# Patient Record
Sex: MALE | Race: OTHER | HISPANIC OR LATINO | Employment: UNEMPLOYED | URBAN - METROPOLITAN AREA
[De-identification: names, ages, dates, MRNs, and addresses within clinical notes are randomized per-mention and may not be internally consistent; named-entity substitution may affect disease eponyms.]

---
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
## 2024-08-04 ENCOUNTER — HOSPITAL ENCOUNTER (EMERGENCY)
Facility: HOSPITAL | Age: 19
Discharge: HOME | End: 2024-08-04
Attending: EMERGENCY MEDICINE
Payer: COMMERCIAL

## 2024-08-04 VITALS
RESPIRATION RATE: 18 BRPM | DIASTOLIC BLOOD PRESSURE: 96 MMHG | TEMPERATURE: 98.5 F | HEART RATE: 91 BPM | OXYGEN SATURATION: 98 % | SYSTOLIC BLOOD PRESSURE: 145 MMHG | WEIGHT: 248.24 LBS

## 2024-08-04 DIAGNOSIS — M25.562 ACUTE PAIN OF LEFT KNEE: Primary | ICD-10-CM

## 2024-08-04 DIAGNOSIS — M79.671 HEEL PAIN, BILATERAL: ICD-10-CM

## 2024-08-04 DIAGNOSIS — M79.672 HEEL PAIN, BILATERAL: ICD-10-CM

## 2024-08-04 PROCEDURE — 99282 EMERGENCY DEPT VISIT SF MDM: CPT

## 2024-08-04 PROCEDURE — 99283 EMERGENCY DEPT VISIT LOW MDM: CPT | Performed by: EMERGENCY MEDICINE

## 2024-08-04 NOTE — Clinical Note
Addi Ji was seen and treated in our emergency department on 8/4/2024.                Diagnosis:     Silvinoer  .    He may return on this date:     Addi Ji was seen in the emergency department on August 4 and can return to work on August 6.     If you have any questions or concerns, please don't hesitate to call.      Eli Menon, DO    ______________________________           _______________          _______________  Hospital Representative                              Date                                Time

## 2024-08-04 NOTE — DISCHARGE INSTRUCTIONS
You were seen in the emergency department in the emergency department for your heel and knee pain.    Please return to the emergency department if you develop fever, chills, redness, swelling in your legs, or your symptoms worsen.    Please see your primary care provider to discuss today's visit.    A referral to podiatry has been placed.

## 2024-08-04 NOTE — ED PROVIDER NOTES
History  Chief Complaint   Patient presents with    Leg Pain     In bilateral heels and leg joints for 3 days.  Pt denies trauma     18-year-old previously healthy male presenting for bilateral heel pain that began a month ago.  He states there is no trauma to the area or inciting incident.  He describes it as a dull ache particularly after standing for a long day at work.  Additionally, he has bilateral knee pain which is worse on the left knee that began a few weeks ago.  Also worse with prolonged standing or walking.  He denies any trauma to the area.  He does not endorse any swelling or redness to the area.  He denies fever, redness, swelling, numbness and tingling, travel in the last few months, recent illnesses or sore throats, insect bites.      Leg Pain  Associated symptoms: no back pain, no fatigue and no fever        Prior to Admission Medications   Prescriptions Last Dose Informant Patient Reported? Taking?   bisacodyl (DULCOLAX) 5 mg EC tablet   No No   Sig: Take 1 tablet (5 mg total) by mouth daily as needed for constipation   Patient not taking: Reported on 10/19/2021   bisacodyl (DULCOLAX) 5 mg EC tablet   No No   Sig: Take 1 tablet (5 mg total) by mouth daily as needed for constipation   dicyclomine (BENTYL) 20 mg tablet   No No   Sig: Take 1 tablet (20 mg total) by mouth 2 (two) times a day   Patient not taking: Reported on 10/19/2021   docusate sodium (COLACE) 100 mg capsule   Yes No   Patient not taking: Reported on 9/30/2021   docusate sodium (COLACE) 100 mg capsule   No No   Sig: Take 2 capsules (200 mg total) by mouth 2 (two) times a day   linaCLOtide (Linzess) 145 MCG CAPS   No No   Sig: Take 1 capsule (145 mcg total) by mouth daily   ondansetron (Zofran ODT) 4 mg disintegrating tablet   No No   Sig: Take 2 tablets (8 mg total) by mouth every 8 (eight) hours   polyethylene glycol (GLYCOLAX) 17 GM/SCOOP powder   No No   Sig: Take 17 g by mouth daily   Patient not taking: Reported on 10/19/2021    polyethylene glycol (GLYCOLAX) 17 GM/SCOOP powder   No No   Sig: Take 17 g by mouth daily   polyethylene glycol (MIRALAX) 17 g packet   No No   Sig: Take 17 g by mouth 2 (two) times a day   Patient not taking: Reported on 9/30/2021   senna (SENOKOT) 8.6 mg   No No   Sig: Take 2 tablets (17.2 mg total) by mouth daily at bedtime      Facility-Administered Medications: None       History reviewed. No pertinent past medical history.    History reviewed. No pertinent surgical history.    Family History   Problem Relation Age of Onset    No Known Problems Mother     No Known Problems Father      I have reviewed and agree with the history as documented.    E-Cigarette/Vaping    E-Cigarette Use Never User      E-Cigarette/Vaping Substances    Nicotine No     THC No     CBD No     Flavoring No     Other No     Unknown No      Social History     Tobacco Use    Smoking status: Never    Smokeless tobacco: Never   Vaping Use    Vaping status: Never Used   Substance Use Topics    Alcohol use: Never    Drug use: Never        Review of Systems   Constitutional:  Negative for chills, fatigue and fever.   HENT:  Negative for ear pain and sore throat.    Eyes:  Negative for pain and visual disturbance.   Respiratory:  Negative for cough and shortness of breath.    Cardiovascular:  Negative for chest pain and palpitations.   Gastrointestinal:  Negative for abdominal pain and vomiting.   Genitourinary:  Negative for dysuria and hematuria.   Musculoskeletal:  Positive for arthralgias. Negative for back pain.   Skin:  Negative for color change and rash.   Neurological:  Negative for seizures, syncope, weakness and numbness.   All other systems reviewed and are negative.      Physical Exam  ED Triage Vitals [08/04/24 1838]   Temperature Pulse Respirations Blood Pressure SpO2   98.5 °F (36.9 °C) 91 18 145/96 98 %      Temp Source Heart Rate Source Patient Position - Orthostatic VS BP Location FiO2 (%)   Temporal Monitor -- -- --      Pain  Score       7             Orthostatic Vital Signs  Vitals:    08/04/24 1838   BP: 145/96   Pulse: 91       Physical Exam  Vitals and nursing note reviewed.   Constitutional:       General: He is not in acute distress.     Appearance: He is well-developed.   HENT:      Head: Normocephalic and atraumatic.   Eyes:      Conjunctiva/sclera: Conjunctivae normal.   Cardiovascular:      Rate and Rhythm: Normal rate and regular rhythm.      Heart sounds: No murmur heard.  Pulmonary:      Effort: Pulmonary effort is normal. No respiratory distress.      Breath sounds: Normal breath sounds.   Abdominal:      Palpations: Abdomen is soft.      Tenderness: There is no abdominal tenderness.   Musculoskeletal:         General: No swelling, deformity or signs of injury. Normal range of motion.      Cervical back: Normal range of motion and neck supple.      Right knee: No swelling, deformity, effusion, erythema, ecchymosis, lacerations or bony tenderness. Normal range of motion. No tenderness. No LCL laxity, MCL laxity, ACL laxity or PCL laxity.      Instability Tests: Anterior drawer test negative. Posterior drawer test negative. Anterior Lachman test negative. Medial Milady test negative and lateral Milday test negative.      Left knee: No swelling, deformity, effusion, erythema, ecchymosis, lacerations or bony tenderness. Normal range of motion. Tenderness present. No LCL laxity, MCL laxity, ACL laxity or PCL laxity.     Instability Tests: Anterior drawer test negative. Posterior drawer test negative. Anterior Lachman test negative. Medial Milady test negative and lateral Milady test negative.      Right lower leg: Normal. No tenderness or bony tenderness. No edema.      Left lower leg: Normal. No tenderness or bony tenderness. No edema.      Right foot: Normal. No swelling, tenderness or bony tenderness.      Left foot: Normal. No swelling, tenderness or bony tenderness.      Comments: Mild tenderness palpation on the  "lateral aspect of left knee    No erythema or swelling    Full range of motion bilaterally   Skin:     General: Skin is warm and dry.      Capillary Refill: Capillary refill takes less than 2 seconds.   Neurological:      Mental Status: He is alert.   Psychiatric:         Mood and Affect: Mood normal.         ED Medications  Medications - No data to display    Diagnostic Studies  Results Reviewed       None                   No orders to display         Procedures  Procedures      ED Course         CRAFFT      Flowsheet Row Most Recent Value   CRAFFT Initial Screen: During the past 12 months, did you:    1. Drink any alcohol (more than a few sips)?  No Filed at: 08/04/2024 1839   2. Smoke any marijuana or hashish No Filed at: 08/04/2024 1839   3. Use anything else to get high? (\"anything else\" includes illegal drugs, over the counter and prescription drugs, and things that you sniff or 'armenta')? No Filed at: 08/04/2024 1839                                      Medical Decision Making  Patient is a 18 y.o. healthy male who presents to the ED with bilateral heel pain that started a month ago and pain started couple weeks ago.    Vital signs afebrile and otherwise normal. On exam mild tenderness to the lateral aspect of left knee, full range of motion of lower extremities, no swelling, no erythema, no increased laxity.    History and physical exam most consistent with heel and knee pain after use. However, differential diagnosis included but not limited to septic joint, fracture, bilateral dementias injury.  History consistent with 1 month of soreness and no other symptoms, more consistent with general soreness after use.      Plan: Podiatry referral    View ED course above for further discussion on patient workup.     Patient does not want pain control.    Disposition: stable discharge with podiatry referral./ patient in agreement with plan    Portions of the record may have been created with voice recognition " "software. Occasional wrong word or \"sound a like\" substitutions may have occurred due to the inherent limitations of voice recognition software. Read the chart carefully and recognize, using context, where substitutions have occurred.            Disposition  Final diagnoses:   Acute pain of left knee   Heel pain, bilateral     Time reflects when diagnosis was documented in both MDM as applicable and the Disposition within this note       Time User Action Codes Description Comment    8/4/2024  7:50 PM Eli Menon Add [M25.562] Acute pain of left knee     8/4/2024  7:50 PM Eli Menon Add [M79.671,  M79.672] Heel pain, bilateral           ED Disposition       ED Disposition   Discharge    Condition   Stable    Date/Time   Sun Aug 4, 2024 1954    Comment   Addi Ji discharge to home/self care.                   Follow-up Information       Follow up With Specialties Details Why Contact Info Additional Information    [facility] Call in 2 days Establish care with a primary care physician if you do not already have one. [address] [facility] [id number]  [phone] [facility], [address], [place], [phone]   [phone]            Discharge Medication List as of 8/4/2024  7:58 PM        CONTINUE these medications which have NOT CHANGED    Details   !! bisacodyl (DULCOLAX) 5 mg EC tablet Take 1 tablet (5 mg total) by mouth daily as needed for constipation, Starting Thu 9/30/2021, Normal      !! bisacodyl (DULCOLAX) 5 mg EC tablet Take 1 tablet (5 mg total) by mouth daily as needed for constipation, Starting Tue 10/19/2021, Normal      dicyclomine (BENTYL) 20 mg tablet Take 1 tablet (20 mg total) by mouth 2 (two) times a day, Starting Tue 9/28/2021, Normal      !! docusate sodium (COLACE) 100 mg capsule Starting Mon 9/27/2021, Historical Med      !! docusate sodium (COLACE) " 100 mg capsule Take 2 capsules (200 mg total) by mouth 2 (two) times a day, Starting Thu 9/30/2021, Normal      linaCLOtide (Linzess) 145 MCG CAPS Take 1 capsule (145 mcg total) by mouth daily, Starting Tue 10/19/2021, Normal      ondansetron (Zofran ODT) 4 mg disintegrating tablet Take 2 tablets (8 mg total) by mouth every 8 (eight) hours, Starting Thu 9/30/2021, Normal      !! polyethylene glycol (GLYCOLAX) 17 GM/SCOOP powder Take 17 g by mouth daily, Starting Thu 9/30/2021, Normal      !! polyethylene glycol (GLYCOLAX) 17 GM/SCOOP powder Take 17 g by mouth daily, Starting Tue 10/19/2021, Normal      polyethylene glycol (MIRALAX) 17 g packet Take 17 g by mouth 2 (two) times a day, Starting Tue 9/28/2021, Normal      senna (SENOKOT) 8.6 mg Take 2 tablets (17.2 mg total) by mouth daily at bedtime, Starting Thu 9/30/2021, Normal       !! - Potential duplicate medications found. Please discuss with provider.            PDMP Review       None             ED Provider  Attending physically available and evaluated Addi Ji. I managed the patient along with the ED Attending.    Electronically Signed by           Eli Menon DO  08/05/24 1058

## 2024-08-05 NOTE — ED ATTENDING ATTESTATION
8/4/2024  I, Mikie Viramontes MD, saw and evaluated the patient. I have discussed the patient with the resident/non-physician practitioner and agree with the resident's/non-physician practitioner's findings, Plan of Care, and MDM as documented in the resident's/non-physician practitioner's note, except where noted. All available labs and Radiology studies were reviewed.  I was present for key portions of any procedure(s) performed by the resident/non-physician practitioner and I was immediately available to provide assistance.       At this point I agree with the current assessment done in the Emergency Department.  I have conducted an independent evaluation of this patient a history and physical is as follows:    ED Course  ED Course as of 08/04/24 2126   Sun Aug 04, 2024   1920 Per resident h&p 19 YO M presents for bilateral heel pain bilateral knee pain; has not taken any OTC at home ; no insect bites; no trauma ; no numbness or tingling; O: no redness no swelling knees or heels; I/P musculoskeletal pain     Emergency Department Note- Addi Ji 18 y.o. male MRN: [id number]    Unit/Bed#: ED 10 Encounter: [id number]    Addi Ji is a 18 y.o. male who presents with   Chief Complaint   Patient presents with    Leg Pain     In bilateral heels and leg joints for 3 days.  Pt denies trauma         History of Present Illness   HPI:  Addi Ji is a 18 y.o. male who presents for evaluation of:  Bilateral plantar foot pain and bilateral knee pain over the last 3 days.  Patient denies any trauma to the area.  Patient notes that walking on concrete provokes his discomfort.  He has not taken any over-the-counter medications to treat his discomfort.  He has no history of connective tissue disorders.  He denies any swelling of his lower extremities.    Review of Systems   Constitutional:  Negative for fatigue and fever.   HENT:  Negative for congestion and sore throat.    Respiratory:  Negative  for cough and shortness of breath.    Cardiovascular:  Negative for chest pain and palpitations.   Gastrointestinal:  Negative for abdominal pain and nausea.   Genitourinary:  Negative for flank pain and frequency.   Neurological:  Negative for light-headedness and headaches.   Psychiatric/Behavioral:  Negative for dysphoric mood and hallucinations.    All other systems reviewed and are negative.      Historical Information   History reviewed. No pertinent past medical history.  History reviewed. No pertinent surgical history.  Social History   Social History     Substance and Sexual Activity   Alcohol Use Never     Social History     Substance and Sexual Activity   Drug Use Never     Social History     Tobacco Use   Smoking Status Never   Smokeless Tobacco Never     Family History:   Family History   Problem Relation Age of Onset    No Known Problems Mother     No Known Problems Father        Meds/Allergies   PTA meds:   Prior to Admission Medications   Prescriptions Last Dose Informant Patient Reported? Taking?   bisacodyl (DULCOLAX) 5 mg EC tablet   No No   Sig: Take 1 tablet (5 mg total) by mouth daily as needed for constipation   Patient not taking: Reported on 10/19/2021   bisacodyl (DULCOLAX) 5 mg EC tablet   No No   Sig: Take 1 tablet (5 mg total) by mouth daily as needed for constipation   dicyclomine (BENTYL) 20 mg tablet   No No   Sig: Take 1 tablet (20 mg total) by mouth 2 (two) times a day   Patient not taking: Reported on 10/19/2021   docusate sodium (COLACE) 100 mg capsule   Yes No   Patient not taking: Reported on 9/30/2021   docusate sodium (COLACE) 100 mg capsule   No No   Sig: Take 2 capsules (200 mg total) by mouth 2 (two) times a day   linaCLOtide (Linzess) 145 MCG CAPS   No No   Sig: Take 1 capsule (145 mcg total) by mouth daily   ondansetron (Zofran ODT) 4 mg disintegrating tablet   No No   Sig: Take 2 tablets (8 mg total) by mouth every 8 (eight) hours   polyethylene glycol (GLYCOLAX) 17  GM/SCOOP powder   No No   Sig: Take 17 g by mouth daily   Patient not taking: Reported on 10/19/2021   polyethylene glycol (GLYCOLAX) 17 GM/SCOOP powder   No No   Sig: Take 17 g by mouth daily   polyethylene glycol (MIRALAX) 17 g packet   No No   Sig: Take 17 g by mouth 2 (two) times a day   Patient not taking: Reported on 9/30/2021   senna (SENOKOT) 8.6 mg   No No   Sig: Take 2 tablets (17.2 mg total) by mouth daily at bedtime      Facility-Administered Medications: None     No Known Allergies    Objective   First Vitals:   Blood Pressure: 145/96 (08/04/24 1838)  Pulse: 91 (08/04/24 1838)  Temperature: 98.5 °F (36.9 °C) (08/04/24 1838)  Temp Source: Temporal (08/04/24 1838)  Respirations: 18 (08/04/24 1838)  Weight - Scale: 113 kg (248 lb 3.8 oz) (08/04/24 1854)  SpO2: 98 % (08/04/24 1838)    Current Vitals:   Blood Pressure: 145/96 (08/04/24 1838)  Pulse: 91 (08/04/24 1838)  Temperature: 98.5 °F (36.9 °C) (08/04/24 1838)  Temp Source: Temporal (08/04/24 1838)  Respirations: 18 (08/04/24 1838)  Weight - Scale: 113 kg (248 lb 3.8 oz) (08/04/24 1854)  SpO2: 98 % (08/04/24 1838)    No intake or output data in the 24 hours ending 08/04/24 2126    Invasive Devices       None                   Physical Exam  Vitals and nursing note reviewed.   Constitutional:       General: He is not in acute distress.     Appearance: Normal appearance. He is well-developed.   HENT:      Head: Normocephalic and atraumatic.      Right Ear: External ear normal.      Left Ear: External ear normal.      Nose: Nose normal.      Mouth/Throat:      Pharynx: No oropharyngeal exudate.   Eyes:      Conjunctiva/sclera: Conjunctivae normal.      Pupils: Pupils are equal, round, and reactive to light.   Cardiovascular:      Rate and Rhythm: Normal rate and regular rhythm.   Pulmonary:      Effort: Pulmonary effort is normal. No respiratory distress.   Abdominal:      General: Abdomen is flat. There is no distension.      Palpations: Abdomen is soft.  "  Musculoskeletal:         General: No deformity. Normal range of motion.      Cervical back: Normal range of motion and neck supple.   Skin:     General: Skin is warm and dry.      Capillary Refill: Capillary refill takes less than 2 seconds.   Neurological:      General: No focal deficit present.      Mental Status: He is alert and oriented to person, place, and time. Mental status is at baseline.      Coordination: Coordination normal.   Psychiatric:         Mood and Affect: Mood normal.         Behavior: Behavior normal.         Thought Content: Thought content normal.         Judgment: Judgment normal.           Medical Decision Makin.  Bilateral knee and plantar foot pain: Musculoskeletal pain; NSAIDs as an outpatient; follow-up with podiatry as an outpatient.    No results found for this or any previous visit (from the past 36 hour(s)).  No orders to display         Portions of the record may have been created with voice recognition software. Occasional wrong word or \"sound a like\" substitutions may have occurred due to the inherent limitations of voice recognition software.  Read the chart carefully and recognize, using context, where substitutions have occurred.        Critical Care Time  Procedures      "